# Patient Record
Sex: FEMALE | Race: OTHER | Employment: OTHER | URBAN - METROPOLITAN AREA
[De-identification: names, ages, dates, MRNs, and addresses within clinical notes are randomized per-mention and may not be internally consistent; named-entity substitution may affect disease eponyms.]

---

## 2019-11-08 NOTE — PATIENT DISCUSSION
**** 11/18/19 The patient has reconsidered there goal for surgery. The patient expressed a desire to see through the full range of vision from distance, to middle, to near without glasses. The limitations of advanced lens technology were reviewed and the recommendation was made for an extended depth of focus lens (Symfony) in combination with a multifocal lens. Patient understands that each lens will provide only 2 of the 3 ranges of vision. Side effects, specifically halos, reduced contrast, and a 1 in 500 exchange rate due to failure to adapt to the lens were discussed as was the need for enhancement in some cases. The patient elects to proceed with Symofony OD, goal of emmetropia. Yamel Ramsay****.

## 2019-11-08 NOTE — PATIENT DISCUSSION
Custom monovision helps to lessen the dependence on glasses, but is a compromise and glasses are often still needed for some activities including driving, binocular vision, and intermediate area activities. The patient understands there is a possibility they may need an enhancement after surgery. The patient elects Custom Vision OD, goal of ross tropia.

## 2019-11-13 NOTE — PATIENT DISCUSSION
The patient expressed a desire to see through the full range of vision from distance, to middle, to near without glasses. The limitations of advanced lens technology were reviewed and the recommendation was made for an extended depth of focus lens (Symfony) in combination with a multifocal lens. Patient understands that each lens will provide only 2 of the 3 ranges of vision. Side effects, specifically halos, reduced contrast, and a 1 in 500 exchange rate due to failure to adapt to the lens were discussed as was the need for enhancement in some cases. The patient elects to proceed with Symofony OD, goal of emmetropia.

## 2019-12-09 NOTE — PATIENT DISCUSSION
The patient expressed a desire to see through the full range of vision from distance, to middle, to near without glasses. The limitations of advanced lens technology were reviewed and the recommendation was made for an extended depth of focus lens (Symfony) in combination with a multifocal lens. Patient understands that each lens will provide only 2 of the 3 ranges of vision. Side effects, specifically halos, reduced contrast, and a 1 in 500 exchange rate due to failure to adapt to the lens were discussed as was the need for enhancement in some cases. The patient elects to proceed with Symphony OD, goal of emmetropia.

## 2019-12-16 NOTE — PATIENT DISCUSSION
Artificial Tears: One drop to both eyes 3-4 times daily. We recommend Systane or Refresh lubricating eye drops which can be found at any pharmacy.
Discussed the risks/benefits of YAG Laser Capsulotomy.
Monitor.
Observe.
Patient is doing well post-operatively. The importance of post-op drop compliance was emphasized. Drop schedule reviewed with patient. Patient to call if any visual changes or concerns.
Patient made aware of 24/7 emergency services.
Patient understands there is an increased risk of corneal edema after cataract surgery.
Recommend AREDS 2 multivitamin supplements.
Recommended observation.
The patient feels that the cataract is significantly impacting daily activities and has elected cataract surgery. The risks, benefits, and alternatives to surgery were discussed. The patient elects to proceed with surgery.
ok to proceed with IOL OS due to FD dec for Sx OS made  with KMS and Goal: TMF ross OS.
H/O total hysterectomy with bilateral salpingo-oophorectomy (BSO)

## 2020-06-09 NOTE — PROCEDURE NOTE: SURGICAL
<p>Prior to commencing surgery patient identification, surgical procedure, site, and side were confirmed by Dr. Iglesias.&nbsp; Following topical proparacaine anesthesia, the patient was positioned at the YAG laser, a contact lens coupled to the cornea of the right eye with methylcellulose and an axial posterior capsulotomy performed without complication using 3.5 Mj x 29. Attention was then turned to the left eye and a contact lens coupled to the cornea of the left eye with methylcellulose and an axial posterior capsulotomy performed without complication using 3.5 Mj x 34. One drop of Alphagan was instilled in both eyes and the patient returned to the holding area having tolerated the procedure well and without complication. </p><p>MRN:[id number]</p>

## 2021-07-02 ENCOUNTER — NEW PATIENT COMPREHENSIVE (OUTPATIENT)
Dept: URBAN - METROPOLITAN AREA CLINIC 36 | Facility: CLINIC | Age: 81
End: 2021-07-02

## 2021-07-02 DIAGNOSIS — H25.813: ICD-10-CM

## 2021-07-02 DIAGNOSIS — H52.7: ICD-10-CM

## 2021-07-02 PROCEDURE — 92015 DETERMINE REFRACTIVE STATE: CPT

## 2021-07-02 PROCEDURE — 92004 COMPRE OPH EXAM NEW PT 1/>: CPT

## 2021-07-02 ASSESSMENT — TONOMETRY
OS_IOP_MMHG: 18
OD_IOP_MMHG: 18

## 2021-07-02 ASSESSMENT — VISUAL ACUITY
OD_CC: 20/30
OD_CC: J6
OD_SC: >J12
OS_SC: 20/100-1
OS_CC: 20/50
OD_BAT: 20/400
OS_SC: >J12
OS_BAT: 20/200
OD_SC: 20/100-1
OS_CC: J3

## 2021-08-06 ENCOUNTER — CATARACT CONSULT (OUTPATIENT)
Dept: URBAN - METROPOLITAN AREA CLINIC 36 | Facility: CLINIC | Age: 81
End: 2021-08-06

## 2021-08-06 DIAGNOSIS — H25.813: ICD-10-CM

## 2021-08-06 DIAGNOSIS — H04.123: ICD-10-CM

## 2021-08-06 PROCEDURE — 92004 COMPRE OPH EXAM NEW PT 1/>: CPT

## 2021-08-06 PROCEDURE — 92136TC INTERFEROMETRY - TECHNICAL COMPONENT

## 2021-08-06 PROCEDURE — 92025-3 CORNEAL TOPO, REFUSED

## 2021-08-06 ASSESSMENT — VISUAL ACUITY
OS_SC: J12
OS_CC: J2
OD_CC: J3
OD_SC: 20/100
OD_SC: >J12
OD_RAM: 20/30
OS_AM: 20/30
OD_CC: 20/40-1
OS_BAT: 20/200
OD_BAT: 20/400
OS_CC: 20/50+1
OS_SC: 20/100

## 2021-08-06 ASSESSMENT — TONOMETRY
OS_IOP_MMHG: 19
OD_IOP_MMHG: 18

## 2021-08-30 ENCOUNTER — PRE-OP/H&P (OUTPATIENT)
Dept: URBAN - METROPOLITAN AREA CLINIC 39 | Facility: CLINIC | Age: 81
End: 2021-08-30

## 2021-08-30 ENCOUNTER — SURGERY/PROCEDURE (OUTPATIENT)
Dept: URBAN - METROPOLITAN AREA CLINIC 36 | Facility: CLINIC | Age: 81
End: 2021-08-30

## 2021-08-30 DIAGNOSIS — H25.813: ICD-10-CM

## 2021-08-30 DIAGNOSIS — H25.811: ICD-10-CM

## 2021-08-30 DIAGNOSIS — H27.8: ICD-10-CM

## 2021-08-30 PROCEDURE — 66984 XCAPSL CTRC RMVL W/O ECP: CPT

## 2021-08-30 PROCEDURE — 99211HP H&P OFFICE/OUTPATIENT VISIT, EST

## 2021-08-31 ENCOUNTER — CATARACT POST-OP 1-DAY (OUTPATIENT)
Dept: URBAN - METROPOLITAN AREA CLINIC 47 | Facility: CLINIC | Age: 81
End: 2021-08-31

## 2021-08-31 DIAGNOSIS — Z96.1: ICD-10-CM

## 2021-08-31 PROCEDURE — 99024 POSTOP FOLLOW-UP VISIT: CPT

## 2021-08-31 RX ORDER — BRIMONIDINE TARTRATE, TIMOLOL MALEATE 2; 5 MG/ML; MG/ML: 1 SOLUTION/ DROPS OPHTHALMIC TWICE A DAY

## 2021-08-31 ASSESSMENT — VISUAL ACUITY
OD_PH: 20/60
OD_SC: 20/150
OS_SC: 20/100

## 2021-08-31 ASSESSMENT — TONOMETRY
OS_IOP_MMHG: 18
OD_IOP_MMHG: 53

## 2021-08-31 NOTE — PATIENT DISCUSSION
Will make changes to drop therapy. Discontinue Acetazolamide. Discontinue Timolol. Start Dorzolamide/Timolol OU BID. Continue Latanoprost OU Qhs. Follow up in 2 weeks for IOP check with HVF/RNFL OCT. Request glaucoma records from previous doctor.

## 2021-09-01 ENCOUNTER — POST-OP (OUTPATIENT)
Dept: URBAN - METROPOLITAN AREA CLINIC 36 | Facility: CLINIC | Age: 81
End: 2021-09-01

## 2021-09-01 DIAGNOSIS — Z96.1: ICD-10-CM

## 2021-09-01 PROCEDURE — 99024 POSTOP FOLLOW-UP VISIT: CPT

## 2021-09-01 ASSESSMENT — TONOMETRY
OS_IOP_MMHG: 14
OD_IOP_MMHG: 17

## 2021-09-01 ASSESSMENT — VISUAL ACUITY
OS_CC: 20/40-1
OD_PH: 20/70
OD_SC: 20/80

## 2021-09-07 ENCOUNTER — SURGERY/PROCEDURE (OUTPATIENT)
Dept: URBAN - METROPOLITAN AREA CLINIC 36 | Facility: CLINIC | Age: 81
End: 2021-09-07

## 2021-09-07 ENCOUNTER — POST-OP CATARACT (OUTPATIENT)
Dept: URBAN - METROPOLITAN AREA SURGERY 14 | Facility: SURGERY | Age: 81
End: 2021-09-07

## 2021-09-07 DIAGNOSIS — H25.812: ICD-10-CM

## 2021-09-07 DIAGNOSIS — Z96.1: ICD-10-CM

## 2021-09-07 DIAGNOSIS — H25.811: ICD-10-CM

## 2021-09-07 PROCEDURE — 66984 XCAPSL CTRC RMVL W/O ECP: CPT

## 2021-09-07 RX ORDER — BRIMONIDINE TARTRATE, TIMOLOL MALEATE 2; 5 MG/ML; MG/ML
1 SOLUTION/ DROPS OPHTHALMIC TWICE A DAY
Start: 2021-09-07

## 2021-09-07 ASSESSMENT — VISUAL ACUITY
OD_SC: 20/80-1
OD_PH: 20/70

## 2021-09-07 ASSESSMENT — TONOMETRY: OD_IOP_MMHG: 19

## 2021-09-08 ENCOUNTER — CATARACT POST-OP 1-DAY (OUTPATIENT)
Dept: URBAN - METROPOLITAN AREA CLINIC 36 | Facility: CLINIC | Age: 81
End: 2021-09-08

## 2021-09-08 DIAGNOSIS — Z96.1: ICD-10-CM

## 2021-09-08 PROCEDURE — 99024 POSTOP FOLLOW-UP VISIT: CPT

## 2021-09-08 ASSESSMENT — TONOMETRY
OS_IOP_MMHG: 20
OD_IOP_MMHG: 17

## 2021-09-08 ASSESSMENT — VISUAL ACUITY
OD_PH: 20/40-1
OS_SC: 20/100
OD_SC: 20/60-1
OS_PH: 20/70

## 2021-09-15 ENCOUNTER — POST-OP CATARACT (OUTPATIENT)
Dept: URBAN - METROPOLITAN AREA CLINIC 36 | Facility: CLINIC | Age: 81
End: 2021-09-15

## 2021-09-15 DIAGNOSIS — Z96.1: ICD-10-CM

## 2021-09-15 PROCEDURE — 99024 POSTOP FOLLOW-UP VISIT: CPT

## 2021-09-15 ASSESSMENT — VISUAL ACUITY
OS_SC: 20/60-1
OD_SC: 20/50

## 2021-09-15 ASSESSMENT — TONOMETRY
OD_IOP_MMHG: 18
OS_IOP_MMHG: 16

## 2021-10-12 NOTE — PATIENT DISCUSSION
Discussed hvf findings from 9/2021 with patient today,  recommend patient start rhopressa at bedtime in both eyes.  sample given.  will recheck iop in 1 week.  discussed redness with pateint.

## 2021-10-13 ENCOUNTER — POST-OP CATARACT (OUTPATIENT)
Dept: URBAN - METROPOLITAN AREA CLINIC 36 | Facility: CLINIC | Age: 81
End: 2021-10-13

## 2021-10-13 DIAGNOSIS — Z96.1: ICD-10-CM

## 2021-10-13 PROCEDURE — 99024 POSTOP FOLLOW-UP VISIT: CPT

## 2021-10-13 ASSESSMENT — VISUAL ACUITY
OD_SC: 20/100
OS_SC: 20/40+1
OD_PH: 20/40+2

## 2021-10-13 ASSESSMENT — TONOMETRY
OD_IOP_MMHG: 15
OS_IOP_MMHG: 17

## 2021-10-26 NOTE — PATIENT DISCUSSION
Patient was not taking the Dorzolamide/Timolol he was only taking Timolol. Patient to discontinue Timolol and start taking the Dorzolamide/Timolol OU BID.

## 2021-11-02 NOTE — PATIENT DISCUSSION
No significant decrease in IOP. Also with hyperemia on exam today. Will make changes to drop therapy. Discontinue Dorzolamide/timolol. Start Combigan OU BID. (sample given) Continue Latanoprost and Rhopressa. Follow up in 1 week.

## 2021-11-09 NOTE — PATIENT DISCUSSION
decrease in IOP. Also with hyperemia on exam today. Will make changes to drop therapy. Continue Combigan OU BID.  Continue Latanoprost and Rhopressa. Follow up in 1 week.

## 2022-04-13 ENCOUNTER — COMPREHENSIVE EXAM (OUTPATIENT)
Dept: URBAN - METROPOLITAN AREA CLINIC 47 | Facility: CLINIC | Age: 82
End: 2022-04-13

## 2022-04-13 DIAGNOSIS — H26.493: ICD-10-CM

## 2022-04-13 DIAGNOSIS — H25.811: ICD-10-CM

## 2022-04-13 DIAGNOSIS — H52.7: ICD-10-CM

## 2022-04-13 DIAGNOSIS — H04.123: ICD-10-CM

## 2022-04-13 PROCEDURE — 92014 COMPRE OPH EXAM EST PT 1/>: CPT

## 2022-04-13 PROCEDURE — 92015 DETERMINE REFRACTIVE STATE: CPT

## 2022-04-13 ASSESSMENT — VISUAL ACUITY
OD_CC: J3
OS_CC: J6
OD_BAT: 20/60
OS_CC: 20/60-1
OD_CC: 20/40
OD_SC: 20/80-1
OS_SC: 20/70
OS_BAT: 20/60-1

## 2022-04-13 ASSESSMENT — TONOMETRY
OS_IOP_MMHG: 14
OD_IOP_MMHG: 14

## 2022-05-03 NOTE — PATIENT DISCUSSION
IOP in a good range today.  Patient can not continue rhopressa due to cost.  Recommend patient discontinue.   recommend patient start brimonidine 0.2% twice a day in both eyes, Continue Dorzolamine/Timolol OU BID.  Continue Latanoprost at bedtime in both eyes.  will see patient back in 4 months for dilated exam and hvf/oct rnfl.

## 2022-05-11 ENCOUNTER — COMPREHENSIVE EXAM (OUTPATIENT)
Dept: URBAN - METROPOLITAN AREA SURGERY 14 | Facility: SURGERY | Age: 82
End: 2022-05-11

## 2022-05-11 ENCOUNTER — SURGERY/PROCEDURE (OUTPATIENT)
Dept: URBAN - METROPOLITAN AREA SURGERY 14 | Facility: SURGERY | Age: 82
End: 2022-05-11

## 2022-05-11 DIAGNOSIS — H26.493: ICD-10-CM

## 2022-05-11 DIAGNOSIS — H04.123: ICD-10-CM

## 2022-05-11 DIAGNOSIS — Z96.1: ICD-10-CM

## 2022-05-11 PROCEDURE — 6682150 YAG CAPSULOTOMY

## 2022-05-11 PROCEDURE — 92014 COMPRE OPH EXAM EST PT 1/>: CPT

## 2022-05-11 ASSESSMENT — VISUAL ACUITY
OD_BAT: 20/60
OS_CC: 20/70+2
OS_CC: J6
OD_CC: 20/60+2
OD_CC: J3
OS_BAT: 20/60

## 2022-05-11 ASSESSMENT — TONOMETRY
OS_IOP_MMHG: 14
OD_IOP_MMHG: 14

## 2022-05-18 ENCOUNTER — POST-OP (OUTPATIENT)
Dept: URBAN - METROPOLITAN AREA CLINIC 47 | Facility: CLINIC | Age: 82
End: 2022-05-18

## 2022-05-18 DIAGNOSIS — Z98.890: ICD-10-CM

## 2022-05-18 DIAGNOSIS — H04.123: ICD-10-CM

## 2022-05-18 DIAGNOSIS — H25.811: ICD-10-CM

## 2022-05-18 PROCEDURE — 99024 POSTOP FOLLOW-UP VISIT: CPT

## 2022-05-18 PROCEDURE — 68761Q PUNCTAL PLUG/QUINTESS DISSOLVABLE PLUG/EACH

## 2022-05-18 PROCEDURE — A4262 TEMPORARY TEAR DUCT PLUG: HCPCS

## 2022-05-18 ASSESSMENT — VISUAL ACUITY
OS_SC: 20/30-2
OD_SC: 20/40-

## 2022-05-18 ASSESSMENT — TONOMETRY
OS_IOP_MMHG: 14
OD_IOP_MMHG: 14

## 2022-07-29 NOTE — PATIENT DISCUSSION
DRY on OCT today check annual for serial analysis.  Amsler grid at home. MVI/AREDS discussed. Patient to call if any changes in vision or grid card.

## 2022-07-29 NOTE — PATIENT DISCUSSION
Artificial Tears: One drop to both eyes 3-4 times daily. We recommend Systane or Refresh lubricating eye drops which can be found at any pharmacy. [Sclera] : the sclera and conjunctiva were normal [Outer Ear] : the ears and nose were normal in appearance [Extraocular Movements] : extraocular movements were intact [Hearing Threshold Finger Rub Not Glynn] : hearing was normal [] : no respiratory distress [Respiration, Rhythm And Depth] : normal respiratory rhythm and effort [Auscultation Breath Sounds / Voice Sounds] : lungs were clear to auscultation bilaterally [Heart Rate And Rhythm] : heart rate and rhythm were normal [Heart Sounds] : normal S1 and S2 [Normal] : oriented to person, place and time, the affect was normal, the mood was normal and not anxious [Oropharynx] : The oropharynx was normal

## 2022-09-06 NOTE — PATIENT DISCUSSION
Discussed option of scheduling an OMNI procedure consult with Dr. Brandy Parham to possibly reduce drop therapy. Patient defers at this time.

## 2022-09-06 NOTE — PATIENT DISCUSSION
HVF/RNFL OCT testing was reviewed with patient. The IOP is in the target range. Continue current Brimonidine, Latanoprost, and Dorzolamide/Timolol drop therapy.

## 2022-11-07 NOTE — PATIENT DISCUSSION
HVF/RNFL OCT testing was reviewed with patient. The IOP is in the target range. Continue current Brimonidine, Latanoprost, and Dorzolamide/Timolol drop therapy.  RTC in 6 months for comprehensive exam.

## 2022-11-07 NOTE — PATIENT DISCUSSION
Discussed option of scheduling an OMNI procedure consult with Dr. Francisca Ceballos to possibly reduce drop therapy. Patient defers at this time.

## 2023-02-07 NOTE — PATIENT DISCUSSION
Artificial Tears: One drop to both eyes 3-4 times daily. We recommend Systane or Refresh lubricating eye drops which can be found at any pharmacy. room air

## 2023-04-19 ENCOUNTER — COMPREHENSIVE EXAM (OUTPATIENT)
Dept: URBAN - METROPOLITAN AREA CLINIC 47 | Facility: CLINIC | Age: 83
End: 2023-04-19

## 2023-04-19 DIAGNOSIS — H52.7: ICD-10-CM

## 2023-04-19 DIAGNOSIS — H34.8312: ICD-10-CM

## 2023-04-19 DIAGNOSIS — H04.123: ICD-10-CM

## 2023-04-19 DIAGNOSIS — I10: ICD-10-CM

## 2023-04-19 PROCEDURE — 92015 DETERMINE REFRACTIVE STATE: CPT

## 2023-04-19 PROCEDURE — 92014 COMPRE OPH EXAM EST PT 1/>: CPT

## 2023-04-19 ASSESSMENT — TONOMETRY
OS_IOP_MMHG: 16
OD_IOP_MMHG: 13

## 2023-04-19 ASSESSMENT — VISUAL ACUITY
OD_CC: 20/30-1
OU_CC: J2
OS_CC: 20/30-2
OS_CC: J3
OD_CC: J3
OU_CC: 20/25-2

## 2023-12-16 NOTE — PATIENT DISCUSSION
DM NORMAL: No signs of diabetic retinopathy or diabetic macular edema on exam. Continue blood sugar and blood pressure control; consider exercise program as directed by PCP. We will re-evaluate in one year or sooner as needed. Letter to PCP. BIBA Tx from [facility] for 2 days of red eyes, and photophobia. No fevers as per dad. No v/d. No trauma to eyes recently as per dad. Pt rec'd 150 mg of Motrin @110 am. Pt. presents awake, alert, wearing sunglasses. Pt. having trouble opening eyes. Denies any vision loss.  TRICIA Harrison, NoPSHx. PMHx of ichthyosis. BIBA Tx from [facility] for 2 days of red eyes, and photophobia. No fevers as per dad. No v/d. No trauma to eyes recently as per dad. Pt rec'd 150 mg of Motrin @110 am. Pt. presents awake, alert, wearing sunglasses. Pt. having trouble opening eyes. Denies any vision loss.  TRICIA Harrison, NoPSHx. PMHx of ichthyosis.

## 2024-04-19 ENCOUNTER — COMPREHENSIVE EXAM (OUTPATIENT)
Dept: URBAN - METROPOLITAN AREA CLINIC 47 | Facility: CLINIC | Age: 84
End: 2024-04-19

## 2024-04-19 DIAGNOSIS — H52.7: ICD-10-CM

## 2024-04-19 DIAGNOSIS — H04.123: ICD-10-CM

## 2024-04-19 PROCEDURE — 92014 COMPRE OPH EXAM EST PT 1/>: CPT

## 2024-04-19 PROCEDURE — 92015 DETERMINE REFRACTIVE STATE: CPT

## 2024-04-19 ASSESSMENT — VISUAL ACUITY
OD_CC: J1
OU_CC: 20/20
OS_CC: 20/25+2
OS_CC: J1
OU_CC: J1
OD_CC: 20/25

## 2024-04-19 ASSESSMENT — TONOMETRY
OD_IOP_MMHG: 14
OS_IOP_MMHG: 17

## 2024-10-16 NOTE — PATIENT DISCUSSION
Discussed starting AREDS supplements, BP Control, and dark leafy green vegetables. What Is The Reason For Today's Visit?: Full Body Skin Examination What Is The Reason For Today's Visit? (Being Monitored For X): concerning skin lesions on an annual basis

## 2024-11-01 NOTE — PATIENT DISCUSSION
s/p Wendie PI OU 12/2020. [FreeTextEntry1] : Available notes, and pertinent labs & imaging in EMR reviewed. Paper records reviewed, scanned to EMR. Pertinent labs and imaging summarized in HPI or A/P.  Labs: 10/2024 labs  RF elevated to 45 ESR elevated to 28 and CRP elevated to 10   Imaging: MRI L Spine 10/2022 Impression:  Unremarkable Lumbar MRI

## 2025-04-17 ENCOUNTER — COMPREHENSIVE EXAM (OUTPATIENT)
Age: 85
End: 2025-04-17

## 2025-04-17 DIAGNOSIS — H04.123: ICD-10-CM

## 2025-04-17 DIAGNOSIS — H52.7: ICD-10-CM

## 2025-04-17 PROCEDURE — 92014 COMPRE OPH EXAM EST PT 1/>: CPT

## 2025-04-17 PROCEDURE — 92015 DETERMINE REFRACTIVE STATE: CPT
